# Patient Record
Sex: FEMALE | Race: WHITE | NOT HISPANIC OR LATINO
[De-identification: names, ages, dates, MRNs, and addresses within clinical notes are randomized per-mention and may not be internally consistent; named-entity substitution may affect disease eponyms.]

---

## 2020-07-06 PROBLEM — Z00.00 ENCOUNTER FOR PREVENTIVE HEALTH EXAMINATION: Status: ACTIVE | Noted: 2020-07-06

## 2020-07-16 ENCOUNTER — APPOINTMENT (OUTPATIENT)
Dept: OBGYN | Facility: CLINIC | Age: 50
End: 2020-07-16
Payer: COMMERCIAL

## 2020-07-16 VITALS
HEIGHT: 67 IN | WEIGHT: 165 LBS | SYSTOLIC BLOOD PRESSURE: 128 MMHG | BODY MASS INDEX: 25.9 KG/M2 | TEMPERATURE: 97.7 F | DIASTOLIC BLOOD PRESSURE: 88 MMHG

## 2020-07-16 DIAGNOSIS — Z87.42 PERSONAL HISTORY OF OTHER DISEASES OF THE FEMALE GENITAL TRACT: ICD-10-CM

## 2020-07-16 DIAGNOSIS — N72 INFLAMMATORY DISEASE OF CERVIX UTERI: ICD-10-CM

## 2020-07-16 DIAGNOSIS — Z97.5 PRESENCE OF (INTRAUTERINE) CONTRACEPTIVE DEVICE: ICD-10-CM

## 2020-07-16 DIAGNOSIS — Z80.49 FAMILY HISTORY OF MALIGNANT NEOPLASM OF OTHER GENITAL ORGANS: ICD-10-CM

## 2020-07-16 DIAGNOSIS — N90.7 VULVAR CYST: ICD-10-CM

## 2020-07-16 DIAGNOSIS — N94.9 UNSPECIFIED CONDITION ASSOCIATED WITH FEMALE GENITAL ORGANS AND MENSTRUAL CYCLE: ICD-10-CM

## 2020-07-16 DIAGNOSIS — Z80.41 FAMILY HISTORY OF MALIGNANT NEOPLASM OF OVARY: ICD-10-CM

## 2020-07-16 DIAGNOSIS — N76.0 ACUTE VAGINITIS: ICD-10-CM

## 2020-07-16 DIAGNOSIS — Z86.19 PERSONAL HISTORY OF OTHER INFECTIOUS AND PARASITIC DISEASES: ICD-10-CM

## 2020-07-16 DIAGNOSIS — R87.611 ATYPICAL SQUAMOUS CELLS CANNOT EXCLUDE HIGH GRADE SQUAMOUS INTRAEPITHELIAL LESION ON CYTOLOGIC SMEAR OF CERVIX (ASC-H): ICD-10-CM

## 2020-07-16 DIAGNOSIS — R87.629 UNSPECIFIED ABNORMAL CYTOLOGICAL FINDINGS IN SPECIMENS FROM VAGINA: ICD-10-CM

## 2020-07-16 DIAGNOSIS — N92.6 IRREGULAR MENSTRUATION, UNSPECIFIED: ICD-10-CM

## 2020-07-16 DIAGNOSIS — B96.89 ACUTE VAGINITIS: ICD-10-CM

## 2020-07-16 DIAGNOSIS — N89.8 OTHER SPECIFIED NONINFLAMMATORY DISORDERS OF VAGINA: ICD-10-CM

## 2020-07-16 DIAGNOSIS — N63.0 UNSPECIFIED LUMP IN UNSPECIFIED BREAST: ICD-10-CM

## 2020-07-16 PROCEDURE — 99396 PREV VISIT EST AGE 40-64: CPT

## 2020-07-16 NOTE — PHYSICAL EXAM
[Alert] : alert [Awake] : awake [No Masses] : no breast masses were palpable [Examination Of The Breasts] : a normal appearance [Soft] : soft [Oriented x3] : oriented to person, place, and time [Normal] : uterus [Uterine Adnexae] : were not tender and not enlarged [No Bleeding] : there was no active vaginal bleeding [IUD String] : had an IUD string protruding out [Acute Distress] : no acute distress [Nipple Discharge] : no nipple discharge [Axillary LAD] : no axillary lymphadenopathy [Mass] : no breast mass [Tender] : non tender

## 2020-07-16 NOTE — COUNSELING
[Nutrition] : nutrition [Breast Self Exam] : breast self exam [Exercise] : exercise [Menstrual Calendar] : menstrual calendar [Vitamins/Supplements] : vitamins/supplements

## 2020-10-05 ENCOUNTER — APPOINTMENT (OUTPATIENT)
Dept: PLASTIC SURGERY | Facility: CLINIC | Age: 50
End: 2020-10-05
Payer: COMMERCIAL

## 2020-10-05 VITALS — WEIGHT: 160 LBS | HEIGHT: 67 IN | BODY MASS INDEX: 25.11 KG/M2

## 2020-10-05 DIAGNOSIS — R23.2 FLUSHING: ICD-10-CM

## 2020-10-05 DIAGNOSIS — Z87.39 PERSONAL HISTORY OF OTHER DISEASES OF THE MUSCULOSKELETAL SYSTEM AND CONNECTIVE TISSUE: ICD-10-CM

## 2020-10-05 PROCEDURE — 99203 OFFICE O/P NEW LOW 30 MIN: CPT

## 2020-10-05 RX ORDER — MV-MIN/FOLIC/VIT K/LYCOP/COQ10 200-100MCG
CAPSULE ORAL
Refills: 0 | Status: ACTIVE | COMMUNITY

## 2020-10-05 NOTE — DATA REVIEWED
[FreeTextEntry1] : 7/720\par BL screening mammogram\par BIRADS2\par bilateral dense breasts\par No masses or calcifications or other findings in either breasts\par Stable small subtle nodular density in -6 oclock right breast\par \par

## 2020-10-05 NOTE — PHYSICAL EXAM
[Bra Size: _______] : Bra Size: [unfilled] [de-identified] : well-appearing, NAD [de-identified] : NCAT [de-identified] : EOMI [de-identified] : supple [de-identified] : good inspiratory effort [de-identified] : chest wall asymmetry\par sternum offset to left ~3 cm with convex downslope toward left\par Bilateral shoulder grooving present [de-identified] : Left breast: no palpable masses, nipple retraction or discharge.\par Right breast: no palpable masses, nipple retraction or discharge.  upper outer quadrant with seborrheic keratosis x 3 with no red flag pigmentary changes\par BL NAC sensate\par No bilateral axillary rolls\par Bilateral wide pendulous macromastia (right < left ~150 g) with Grade II ptosis with no active inframammary fold rash; bilateral superior pole deflation\par Anticipated volume of resection of EACH breast based on CLINICAL ASSESSMENT: 800-1000 g\par Anticipated volume of resection of EACH breast based on BSA: 475 g\par \par Breast measurements (standing, cm):\par R SN-Nipple: 31.5\par R Nipple-IMF: 13\par R Nipple - midsternum: 11\par R NAC diameter: 8.6\par IMF position asymmetrical, left 2.5 cm lower than right breast\par L SN-Nipple: 33\par L Nipple-IMF: 16.5\par L Nipple - midsternum: 12\par L NAC diameter: 9 [de-identified] : soft, NT, ND, moderately protuberant [de-identified] : FROM x 4

## 2020-10-05 NOTE — ASSESSMENT
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 \par -The risks include but are not limited to bleeding, infection, seroma, hematoma, wound separation or poor wound healing, tissue ischemia/necrosis of skin flap or NAC, scarring in particular hypertrophic or keloid scarring, breast asymmetry, need for additional surgery, dissatisfaction with outcome, loss of NAC sensation, and inability to breastfeed postpartum in the future, and no improvement of neck pain.\par -I reviewed with the patient the location of incisional scars, possible use of surgical drain, need to wear surgical support bra post-operatively, and no post-operative heavy lifting.\par -The patient understands the procedure and the risks, and she elects to proceed with reduction mammoplasty. Informed consent was obtained.\par -Will schedule her for outpatient SARAY procedure.\par -Follow up with Dr Wei regarding mammogram\par -Photos were taken.

## 2020-10-05 NOTE — HISTORY OF PRESENT ILLNESS
[FreeTextEntry1] : 49 yo  F perimenopausal with PMHx dense large breasts since puberty.  She had waited for breast reduction until after having children as she wanted to breast feed.  she reports having upper and mid back pain, shoulder strap grooving, and chronic skin changes IMF. Recommended by chiropractor to seek evaluation for breast reduction as ongoing back pain exacerbated by large breasts.\par \par Weight stable for at least 6 months\par \par FamHx: no breast cancer, skin cancer\par +Breastfeeding\par Current bra size: 36 DDD, would like to be as small as possible (B to C cup)

## 2020-10-05 NOTE — REASON FOR VISIT
[Initial Evaluation] : an initial evaluation [FreeTextEntry1] : Charlotte Wei - Breast; BRAYDEN Maria

## 2020-10-20 ENCOUNTER — APPOINTMENT (OUTPATIENT)
Dept: SURGERY | Facility: CLINIC | Age: 50
End: 2020-10-20
Payer: COMMERCIAL

## 2020-10-20 VITALS
OXYGEN SATURATION: 98 % | HEIGHT: 67 IN | WEIGHT: 172 LBS | DIASTOLIC BLOOD PRESSURE: 75 MMHG | HEART RATE: 78 BPM | SYSTOLIC BLOOD PRESSURE: 130 MMHG | TEMPERATURE: 97.1 F | BODY MASS INDEX: 27 KG/M2

## 2020-10-20 DIAGNOSIS — Z80.41 FAMILY HISTORY OF MALIGNANT NEOPLASM OF OVARY: ICD-10-CM

## 2020-10-20 DIAGNOSIS — R92.1 MAMMOGRAPHIC CALCIFICATION FOUND ON DIAGNOSTIC IMAGING OF BREAST: ICD-10-CM

## 2020-10-20 DIAGNOSIS — R92.2 INCONCLUSIVE MAMMOGRAM: ICD-10-CM

## 2020-10-20 PROCEDURE — 99203 OFFICE O/P NEW LOW 30 MIN: CPT

## 2020-10-20 NOTE — PAST MEDICAL HISTORY
[Menstruating] : The patient is menstruating [Menarche Age ____] : age at menarche was [unfilled] [Approximately ___] : the LMP was approximately [unfilled] [Normal Amount/Duration] : it was of a normal amount and duration [Irregular Cycle Intervals] : are  irregular [Total Preg ___] : G[unfilled] [Live Births ___] : P[unfilled]  [Full Term ___] : Full Term: [unfilled] [Age At Live Birth ___] : Age at live birth: [unfilled] [Living ___] : Living: [unfilled] [History of Hormone Replacement Treatment] : has no history of hormone replacement treatment [FreeTextEntry7] : age 29 -29  [FreeTextEntry8] : age 31 and 37 for 3 months each child  [FreeTextEntry6] : none

## 2020-10-20 NOTE — ASSESSMENT
[FreeTextEntry1] : 50 year old female with a macromastia and asymmetry of the breasts with benign findings on mammogram and sonogram. No evidence of malignancy on the clinical exam or radiological evaluation. Because the clinical findings patient is a candidate for reduction mammoplasty for which she can follow up with plastic surgery.

## 2020-10-20 NOTE — PHYSICAL EXAM
[Normocephalic] : normocephalic [No Supraclavicular Adenopathy] : no supraclavicular adenopathy [No Cervical Adenopathy] : no cervical adenopathy [Examined in the supine and seated position] : examined in the supine and seated position [Grade 2] : Ptosis Grade 2 [No dominant masses] : no dominant masses in right breast  [Asymmetrical] : asymmetrical [No dominant masses] : no dominant masses left breast [Breast Mass Right Breast ___cm] : no masses [Breast Mass Left Breast ___cm] : no masses [Breast Nipple Retraction] : nipples not retracted [Breast Nipple Inversion] : nipples not inverted [Breast Nipple Fissures] : nipples not fissured [Breast Abnormal Lactation (Galactorrhea)] : no galactorrhea [Breast Nipple Flattening] : nipples not flattened [Breast Abnormal Secretion Bloody Fluid] : no bloody discharge [Breast Abnormal Secretion Serous Fluid] : no serous discharge [Breast Abnormal Secretion Opalescent Fluid] : no milky discharge [No Axillary Lymphadenopathy] : no left axillary lymphadenopathy

## 2020-10-20 NOTE — REASON FOR VISIT
[Initial Evaluation] : an initial evaluation [FreeTextEntry1] : breast check up having a breast reduction on 11/17/2020 no family Hx of cancer

## 2020-10-20 NOTE — HISTORY OF PRESENT ILLNESS
[FreeTextEntry1] : This 50-year-old female presents for evaluation of a macromastia and dense breast tissue and some calcifications on the mammogram. Patient has no family history of breast cancer and positive family history of ovarian cancer. Patient had mammograms for at least the past 3 years which were reviewed. Patient also a subsequent sonogram this year which was also benign. Because of the macromastia and asymmetry of the breasts left being larger than the right patient's considering reduction mammoplasty for which she was seen by plastic surgery and is awaiting surgery. Patient does report increased heaviness of the breast tissue and back discomfort for which she has been followed by a chiropractor.

## 2020-10-23 ENCOUNTER — APPOINTMENT (OUTPATIENT)
Dept: PLASTIC SURGERY | Facility: CLINIC | Age: 50
End: 2020-10-23
Payer: COMMERCIAL

## 2020-10-23 PROCEDURE — 99211 OFF/OP EST MAY X REQ PHY/QHP: CPT

## 2020-10-23 PROCEDURE — 99072 ADDL SUPL MATRL&STAF TM PHE: CPT

## 2020-10-23 NOTE — HISTORY OF PRESENT ILLNESS
[FreeTextEntry1] : 49 yo  F perimenopausal with PMHx dense large breasts since puberty.  She had waited for breast reduction until after having children as she wanted to breast feed.  she reports having upper and mid back pain, shoulder strap grooving, and chronic skin changes IMF. Recommended by chiropractor to seek evaluation for breast reduction as ongoing back pain exacerbated by large breasts.\par \par Weight stable for at least 6 months\par \par FamHx: no breast cancer, skin cancer\par +Breastfeeding\par Current bra size: 36 DDD, would like to be as small as possible (B to C cup)\par \par Interval hx (10/23/20). Patient presents today with her  for f/u to address additional concerns. Denies any new complaints or changes to her status. Interested in pursuing reduction mammoplasty.

## 2020-10-23 NOTE — ASSESSMENT
[FreeTextEntry1] : 51 yo woman with PMHx of scoliosis with chest wall asymmetry with symptomatic bilateral macromastia with Grade II ptosis who is a good candidate for bilateral reduction mammoplasty with an inferior pedicle Wise pattern technique.\par \par - all patient's questions answered to her satisfaction\par - pre and post-surgical pictures reviewed\par - patient reports readiness to undergo the proposed surgical procedure \par - 15 minutes spent addressing her concerns\par

## 2020-11-02 ENCOUNTER — OUTPATIENT (OUTPATIENT)
Dept: OUTPATIENT SERVICES | Facility: HOSPITAL | Age: 50
LOS: 1 days | Discharge: HOME | End: 2020-11-02
Payer: COMMERCIAL

## 2020-11-02 DIAGNOSIS — N62 HYPERTROPHY OF BREAST: ICD-10-CM

## 2020-11-02 DIAGNOSIS — Z01.818 ENCOUNTER FOR OTHER PREPROCEDURAL EXAMINATION: ICD-10-CM

## 2020-11-02 LAB
ALBUMIN SERPL ELPH-MCNC: 4.6 G/DL — SIGNIFICANT CHANGE UP (ref 3.5–5.2)
ALP SERPL-CCNC: 86 U/L — SIGNIFICANT CHANGE UP (ref 30–115)
ALT FLD-CCNC: 14 U/L — SIGNIFICANT CHANGE UP (ref 0–41)
ANION GAP SERPL CALC-SCNC: 9 MMOL/L — SIGNIFICANT CHANGE UP (ref 7–14)
APTT BLD: 28.6 SEC — SIGNIFICANT CHANGE UP (ref 27–39.2)
AST SERPL-CCNC: 14 U/L — SIGNIFICANT CHANGE UP (ref 0–41)
BASOPHILS # BLD AUTO: 0.03 K/UL — SIGNIFICANT CHANGE UP (ref 0–0.2)
BASOPHILS NFR BLD AUTO: 0.5 % — SIGNIFICANT CHANGE UP (ref 0–1)
BILIRUB SERPL-MCNC: 0.2 MG/DL — SIGNIFICANT CHANGE UP (ref 0.2–1.2)
BUN SERPL-MCNC: 14 MG/DL — SIGNIFICANT CHANGE UP (ref 10–20)
CALCIUM SERPL-MCNC: 9.7 MG/DL — SIGNIFICANT CHANGE UP (ref 8.5–10.1)
CHLORIDE SERPL-SCNC: 103 MMOL/L — SIGNIFICANT CHANGE UP (ref 98–110)
CO2 SERPL-SCNC: 26 MMOL/L — SIGNIFICANT CHANGE UP (ref 17–32)
CREAT SERPL-MCNC: 0.8 MG/DL — SIGNIFICANT CHANGE UP (ref 0.7–1.5)
EOSINOPHIL # BLD AUTO: 0.07 K/UL — SIGNIFICANT CHANGE UP (ref 0–0.7)
EOSINOPHIL NFR BLD AUTO: 1.2 % — SIGNIFICANT CHANGE UP (ref 0–8)
GLUCOSE SERPL-MCNC: 84 MG/DL — SIGNIFICANT CHANGE UP (ref 70–99)
HCT VFR BLD CALC: 39.6 % — SIGNIFICANT CHANGE UP (ref 37–47)
HGB BLD-MCNC: 12.8 G/DL — SIGNIFICANT CHANGE UP (ref 12–16)
IMM GRANULOCYTES NFR BLD AUTO: 0.2 % — SIGNIFICANT CHANGE UP (ref 0.1–0.3)
INR BLD: 0.97 RATIO — SIGNIFICANT CHANGE UP (ref 0.65–1.3)
LYMPHOCYTES # BLD AUTO: 1.1 K/UL — LOW (ref 1.2–3.4)
LYMPHOCYTES # BLD AUTO: 19.5 % — LOW (ref 20.5–51.1)
MCHC RBC-ENTMCNC: 31 PG — SIGNIFICANT CHANGE UP (ref 27–31)
MCHC RBC-ENTMCNC: 32.3 G/DL — SIGNIFICANT CHANGE UP (ref 32–37)
MCV RBC AUTO: 95.9 FL — SIGNIFICANT CHANGE UP (ref 81–99)
MONOCYTES # BLD AUTO: 0.47 K/UL — SIGNIFICANT CHANGE UP (ref 0.1–0.6)
MONOCYTES NFR BLD AUTO: 8.3 % — SIGNIFICANT CHANGE UP (ref 1.7–9.3)
NEUTROPHILS # BLD AUTO: 3.96 K/UL — SIGNIFICANT CHANGE UP (ref 1.4–6.5)
NEUTROPHILS NFR BLD AUTO: 70.3 % — SIGNIFICANT CHANGE UP (ref 42.2–75.2)
NRBC # BLD: 0 /100 WBCS — SIGNIFICANT CHANGE UP (ref 0–0)
PLATELET # BLD AUTO: 313 K/UL — SIGNIFICANT CHANGE UP (ref 130–400)
POTASSIUM SERPL-MCNC: 4.9 MMOL/L — SIGNIFICANT CHANGE UP (ref 3.5–5)
POTASSIUM SERPL-SCNC: 4.9 MMOL/L — SIGNIFICANT CHANGE UP (ref 3.5–5)
PROT SERPL-MCNC: 7.5 G/DL — SIGNIFICANT CHANGE UP (ref 6–8)
PROTHROM AB SERPL-ACNC: 11.2 SEC — SIGNIFICANT CHANGE UP (ref 9.95–12.87)
RBC # BLD: 4.13 M/UL — LOW (ref 4.2–5.4)
RBC # FLD: 13 % — SIGNIFICANT CHANGE UP (ref 11.5–14.5)
SODIUM SERPL-SCNC: 138 MMOL/L — SIGNIFICANT CHANGE UP (ref 135–146)
WBC # BLD: 5.64 K/UL — SIGNIFICANT CHANGE UP (ref 4.8–10.8)
WBC # FLD AUTO: 5.64 K/UL — SIGNIFICANT CHANGE UP (ref 4.8–10.8)

## 2020-11-02 PROCEDURE — 93010 ELECTROCARDIOGRAM REPORT: CPT

## 2020-11-04 DIAGNOSIS — Z01.818 ENCOUNTER FOR OTHER PREPROCEDURAL EXAMINATION: ICD-10-CM

## 2020-11-11 DIAGNOSIS — M79.2 NEURALGIA AND NEURITIS, UNSPECIFIED: ICD-10-CM

## 2020-11-11 DIAGNOSIS — G89.18 OTHER ACUTE POSTPROCEDURAL PAIN: ICD-10-CM

## 2020-11-11 RX ORDER — GABAPENTIN 300 MG/1
300 CAPSULE ORAL
Qty: 7 | Refills: 0 | Status: ACTIVE | COMMUNITY
Start: 2020-11-11 | End: 1900-01-01

## 2020-11-11 RX ORDER — CELECOXIB 400 MG/1
400 CAPSULE ORAL
Qty: 7 | Refills: 0 | Status: ACTIVE | COMMUNITY
Start: 2020-11-11 | End: 1900-01-01

## 2020-11-16 ENCOUNTER — TRANSCRIPTION ENCOUNTER (OUTPATIENT)
Age: 50
End: 2020-11-16

## 2020-11-17 ENCOUNTER — APPOINTMENT (OUTPATIENT)
Dept: PLASTIC SURGERY | Facility: AMBULATORY SURGERY CENTER | Age: 50
End: 2020-11-17
Payer: COMMERCIAL

## 2020-11-17 ENCOUNTER — RESULT REVIEW (OUTPATIENT)
Age: 50
End: 2020-11-17

## 2020-11-17 ENCOUNTER — OUTPATIENT (OUTPATIENT)
Dept: OUTPATIENT SERVICES | Facility: HOSPITAL | Age: 50
LOS: 1 days | Discharge: HOME | End: 2020-11-17
Payer: COMMERCIAL

## 2020-11-17 VITALS
HEIGHT: 67 IN | HEART RATE: 72 BPM | RESPIRATION RATE: 18 BRPM | DIASTOLIC BLOOD PRESSURE: 86 MMHG | OXYGEN SATURATION: 98 % | SYSTOLIC BLOOD PRESSURE: 141 MMHG | WEIGHT: 170.42 LBS | TEMPERATURE: 98 F

## 2020-11-17 VITALS
RESPIRATION RATE: 18 BRPM | TEMPERATURE: 98 F | SYSTOLIC BLOOD PRESSURE: 125 MMHG | OXYGEN SATURATION: 98 % | HEART RATE: 80 BPM | DIASTOLIC BLOOD PRESSURE: 69 MMHG

## 2020-11-17 PROCEDURE — 19318 BREAST REDUCTION: CPT | Mod: 50

## 2020-11-17 PROCEDURE — 88305 TISSUE EXAM BY PATHOLOGIST: CPT | Mod: 26

## 2020-11-17 RX ORDER — ACETAMINOPHEN 500 MG
650 TABLET ORAL ONCE
Refills: 0 | Status: DISCONTINUED | OUTPATIENT
Start: 2020-11-17 | End: 2020-12-01

## 2020-11-17 RX ORDER — GABAPENTIN 400 MG/1
1 CAPSULE ORAL
Qty: 0 | Refills: 0 | DISCHARGE

## 2020-11-17 RX ORDER — ONDANSETRON 8 MG/1
1 TABLET, FILM COATED ORAL
Qty: 10 | Refills: 0
Start: 2020-11-17

## 2020-11-17 RX ORDER — ONDANSETRON 8 MG/1
4 TABLET, FILM COATED ORAL ONCE
Refills: 0 | Status: DISCONTINUED | OUTPATIENT
Start: 2020-11-17 | End: 2020-12-01

## 2020-11-17 RX ORDER — HYDROMORPHONE HYDROCHLORIDE 2 MG/ML
0.5 INJECTION INTRAMUSCULAR; INTRAVENOUS; SUBCUTANEOUS
Refills: 0 | Status: DISCONTINUED | OUTPATIENT
Start: 2020-11-17 | End: 2020-11-17

## 2020-11-17 RX ORDER — ACETAMINOPHEN 500 MG
500 TABLET ORAL
Qty: 0 | Refills: 0 | DISCHARGE

## 2020-11-17 RX ORDER — TRAMADOL HYDROCHLORIDE 50 MG/1
1 TABLET ORAL
Qty: 10 | Refills: 0
Start: 2020-11-17

## 2020-11-17 RX ORDER — MORPHINE SULFATE 50 MG/1
2 CAPSULE, EXTENDED RELEASE ORAL
Refills: 0 | Status: DISCONTINUED | OUTPATIENT
Start: 2020-11-17 | End: 2020-11-17

## 2020-11-17 RX ORDER — SODIUM CHLORIDE 9 MG/ML
1000 INJECTION, SOLUTION INTRAVENOUS
Refills: 0 | Status: DISCONTINUED | OUTPATIENT
Start: 2020-11-17 | End: 2020-12-01

## 2020-11-17 RX ADMIN — SODIUM CHLORIDE 100 MILLILITER(S): 9 INJECTION, SOLUTION INTRAVENOUS at 15:23

## 2020-11-17 NOTE — ASU DISCHARGE PLAN (ADULT/PEDIATRIC) - CARE PROVIDER_API CALL
Macrina Wu)  [facility]  [address] [place] [address], [address]  [place] [address]  Phone: [phone]  Fax: [phone]  Follow Up Time:

## 2020-11-17 NOTE — BRIEF OPERATIVE NOTE - OPERATION/FINDINGS
Bilateral breast reduction performed via inferior wise pedicle pattern Bilateral breast reduction performed via inferior wise pedicle pattern  Right breast 532 g; Left breast 754 g

## 2020-11-17 NOTE — PRE-ANESTHESIA EVALUATION ADULT - NSANTHOSAYNRD_GEN_A_CORE
No. SHERRY screening performed.  STOP BANG Legend: 0-2 = LOW Risk; 3-4 = INTERMEDIATE Risk; 5-8 = HIGH Risk

## 2020-11-17 NOTE — ASU DISCHARGE PLAN (ADULT/PEDIATRIC) - ASU DC SPECIAL INSTRUCTIONSFT
Diet: You may resume your usual diet. Avoid alcohol and excessive salt intake for two weeks following surgery. This will help to minimize swelling.    Medication: Continue Gabapentin and Celebrex twice daily until complete. Take Tylenol 650mg every 6 hours. If pain is still not well controlled, take Tramadol as needed. Take all antibiotics as prescribed. Remember, no Aspirin or NSAIDS (Advil, Motrin, Aleve) as they may increase bruising.    Activity: Start walking as soon as possible to increase circulation and prevent blood clots. You may take care of your personal needs as desired, however, no lifting or strenuous activity is allowed for 4 weeks following surgery. Driving is not permitted for at least two weeks.    Wound care: Keep your dressing clean, dry, and intact until seen by MD/PA. Wear the surgical bra which will be provided to you at all times. Do not smoke! It delays wound healing and increases the risk of complications.    Personal Hygiene: Do not get the operative area wet. You are allowed to sponge bathe. Do not remove the surgical bra. No tub soaking.    Sun Exposure: You may be in the sun one month following surgery. Avoid sunburn. Always use a sunscreen of at least SPF30.    Things to expect: The operative area may be bruised, swollen, and painful. You may also have temporary numbness which will improve over time.    In case of emergency: call the office any time day or night. Post-operative care will be provided in the office one week following surgery. If you do not already have an appointment, please call during regular office hours to schedule: [phone].     We wish you a pleasant recovery. Diet: You may resume your usual diet. Avoid alcohol and excessive salt intake for two weeks following surgery. This will help to minimize swelling.    Medication: Continue Gabapentin and Celebrex twice daily until complete. Take Tylenol 650mg every 6 hours. If pain is still not well controlled, take Tramadol as needed. Take all antibiotics as prescribed. Remember, no Aspirin or NSAIDS (Advil, Motrin, Aleve) as they may increase bruising.     Activity: Start walking as soon as possible to increase circulation and prevent blood clots. You may take care of your personal needs as desired, however, no lifting or strenuous activity is allowed for 4 weeks following surgery. Driving is not permitted for at least two weeks.    Wound care: Keep your dressing clean, dry, and intact until seen by MD/PA. Wear the surgical bra which will be provided to you at all times. Do not smoke! It delays wound healing and increases the risk of complications.    Personal Hygiene: Do not get the operative area wet. You are allowed to sponge bathe. Do not remove the surgical bra. No tub soaking.    Sun Exposure: You may be in the sun one month following surgery. Avoid sunburn. Always use a sunscreen of at least SPF30.    Things to expect: The operative area may be bruised, swollen, and painful. You may also have temporary numbness which will improve over time.    In case of emergency: call the office any time day or night. Post-operative care will be provided in the office one week following surgery. If you do not already have an appointment, please call during regular office hours to schedule: [phone].     We wish you a pleasant recovery. Diet: You may resume your usual diet. Avoid alcohol and excessive salt intake for two weeks following surgery. This will help to minimize swelling.    Medication: Continue Gabapentin and Celebrex twice daily until complete. Take Tylenol 650mg every 6 hours. If pain is still not well controlled, take Tramadol as needed. Take all antibiotics as prescribed. Take Zofran as needed for nausea. Remember, no Aspirin or NSAIDS (Advil, Motrin, Aleve) as they may increase bruising.     Activity: Start walking as soon as possible to increase circulation and prevent blood clots. You may take care of your personal needs as desired, however, no lifting or strenuous activity is allowed for 4 weeks following surgery. Driving is not permitted for at least two weeks.    Wound care: Keep your dressing clean, dry, and intact until seen by MD/PA. Wear the surgical bra which will be provided to you at all times. Do not smoke! It delays wound healing and increases the risk of complications.    Personal Hygiene: Do not get the operative area wet. You are allowed to sponge bathe. Do not remove the surgical bra. No tub soaking.    Sun Exposure: You may be in the sun one month following surgery. Avoid sunburn. Always use a sunscreen of at least SPF30.    Things to expect: The operative area may be bruised, swollen, and painful. You may also have temporary numbness which will improve over time.    In case of emergency: call the office any time day or night. Post-operative care will be provided in the office one week following surgery. If you do not already have an appointment, please call during regular office hours to schedule: [phone].     We wish you a pleasant recovery.

## 2020-11-17 NOTE — CHART NOTE - NSCHARTNOTEFT_GEN_A_CORE
PACU ANESTHESIA ADMISSION NOTE      Procedure: Reduction mammoplasty for macromastia      Post op diagnosis:  Macromastia        ____  Intubated  TV:______       Rate: ______      FiO2: ______    __x__  Patent Airway    __x__  Full return of protective reflexes    __x__  Full recovery from anesthesia / back to baseline status    Vitals  HR: 78  BP: 150/81  RR: 15  O2 Sat: 98  Temp: 98.5    Mental Status:  __x__ Awake   _____ Alert   __x___ Drowsy   _____ Sedated    Nausea/Vomiting:  __x__ NO  ______Yes,   See Post - Op Orders          Pain Scale (0-10):  _____    Treatment: ____ None    __x__ See Post - Op/PCA Orders    Post - Operative Fluids:   ____ Oral   __x__ See Post - Op Orders    Plan: Discharge when criteria met:   __x__Home       _____Floor     _____Critical Care   Other:_________________    Comments: Patient had smooth intraoperative event, no anesthesia complication.

## 2020-11-18 ENCOUNTER — NON-APPOINTMENT (OUTPATIENT)
Age: 50
End: 2020-11-18

## 2020-11-18 ENCOUNTER — TRANSCRIPTION ENCOUNTER (OUTPATIENT)
Age: 50
End: 2020-11-18

## 2020-11-20 DIAGNOSIS — M41.9 SCOLIOSIS, UNSPECIFIED: ICD-10-CM

## 2020-11-20 DIAGNOSIS — N62 HYPERTROPHY OF BREAST: ICD-10-CM

## 2020-11-20 DIAGNOSIS — N64.89 OTHER SPECIFIED DISORDERS OF BREAST: ICD-10-CM

## 2020-11-20 LAB — SURGICAL PATHOLOGY STUDY: SIGNIFICANT CHANGE UP

## 2020-11-23 ENCOUNTER — APPOINTMENT (OUTPATIENT)
Dept: PLASTIC SURGERY | Facility: CLINIC | Age: 50
End: 2020-11-23
Payer: COMMERCIAL

## 2020-11-23 PROBLEM — Z78.9 OTHER SPECIFIED HEALTH STATUS: Chronic | Status: ACTIVE | Noted: 2020-11-02

## 2020-11-23 PROCEDURE — 99024 POSTOP FOLLOW-UP VISIT: CPT

## 2020-11-23 NOTE — PHYSICAL EXAM
[de-identified] : well-appearing, NAD [de-identified] : BL breasts soft, warm, swelling as expected R>L, fading ecchymoses, BL nipple sensation intact, no s/s cellulitis\par Right breast negative needle aspiration performed

## 2020-11-23 NOTE — HISTORY OF PRESENT ILLNESS
[FreeTextEntry1] : 51 yo  F perimenopausal with PMHx dense large breasts since puberty.  She had waited for breast reduction until after having children as she wanted to breast feed.  she reports having upper and mid back pain, shoulder strap grooving, and chronic skin changes IMF. Recommended by chiropractor to seek evaluation for breast reduction as ongoing back pain exacerbated by large breasts.\par \par Weight stable for at least 6 months\par \par FamHx: no breast cancer, skin cancer\par +Breastfeeding\par Current bra size: 36 DDD, would like to be as small as possible (B to C cup)\par \par Interval hx (10/23/20). Patient presents today with her  for f/u to address additional concerns. Denies any new complaints or changes to her status. Interested in pursuing reduction mammoplasty. \par \par Interval hx (20): Pt presents today POD #6 s/p BBR. C/o mild postop pain which has resolved, taking only Tylenol PRN. Denies f/c, CP/SOB, calf pain, or cough. Happy with results thus far reporting improvement of neck and back pain.

## 2020-11-23 NOTE — ASSESSMENT
[FreeTextEntry1] : 49 yo woman with PMHx of scoliosis with chest wall asymmetry with symptomatic bilateral macromastia with Grade II ptosis, now POD #6 s/p BBR, doing well\par Right breast needle aspiration perofrmed 11/23- negative\par \par -Pathology reviewed: benign\par -Bandages changed\par -May shower\par -Continue surgical bra, no heavy lifting\par -F/u 1 week for suture removal\par \par Due to COVID-19, pre-visit patient instructions were explained to the patient and their symptoms were checked upon arrival. Masks were used by the healthcare provider and staff and the examination room was cleaned after the patient visit concluded\par \par

## 2020-12-02 ENCOUNTER — APPOINTMENT (OUTPATIENT)
Dept: PLASTIC SURGERY | Facility: CLINIC | Age: 50
End: 2020-12-02
Payer: COMMERCIAL

## 2020-12-02 PROCEDURE — 99024 POSTOP FOLLOW-UP VISIT: CPT

## 2020-12-02 NOTE — DATA REVIEWED
[FreeTextEntry1] :  Pathology             Final\par \par No Documents Attached\par \par \par \par   Brien Accession Number : [id number]\par \par CARINA SHINE 2\par \par \par \par Surgical Final Report\par \par \par \par \par Final Diagnosis\par 1. Right breast tissue (553 gms):\par - Fragments of skin and breast tissue consistent with surgical\par reduction procedure specimen (performed for clinical\par macromastia).\par - Fibrocystic changes\par - No malignancy is seen in the examined tissue sections.\par \par 2. Left breast tissue (798 gms):\par - Fragments of skin and breast tissue consistent with surgical\par reduction procedure specimen (performed for clinical\par macromastia).\par - Fibrocystic changes.\par - No malignancy is seen in the examined tissue sections.\par \par Verified by: Chaim Porter MD\par (Electronic Signature)\par Reported on: 11/20/20 13:00 EST, [address] [place] [address]\[facility] [place] [address]\par Phone: [phone]   Fax: [phone]\par _________________________________________________________________\par \par Clinical History\par Bilateral breast reduction\par \par Specimen(s) Submitted\par 1     Right breast tissue\par 2     Left breast tissue\par \par Gross Description\par 1. The specimen is received fresh, labeled "right breast tissue"\par and consists of fragments of lobulated fibroadipose tissue with\par skin measuring 20 x 15 x 6 cm in aggregate and weighing 553 gm.\par The specimen is serially sectioned to show lobulated yellow\par adipose tissue with multiple whitish firm areas. Representative\par sections are submitted. (5 blocks)\par \par 2. The specimen is received fresh, labeled "left breast tissue"\par and consists of multiple fragments of lobulated fibroadipose\par tissue with skin measuring 20 x 20 x 6 cm in aggregate and\par weighing 798 gm. Specimen is serially sectioned to show yellow\par fibroadipose tissue with whitish areas. Representative sections\par are submitted. (5 blocks)\par \par Specimen was received and underwent gross examination at [facility], [address] [place] [address],\[place] [address].\par \par \par \par \par \par \par TIANNASHERI AQUINOAKBAR             2\par \par \par \par Surgical Final Report\par \par \par \par \par 11/17/20 16:55 ia\par \par Perioperative Diagnosis\par Bilateral macromastia\par \par  \par \par  Ordered by: SEDRICK SOLIMAN       Collected/Examined: 17Nov2020 02:30PM       \par Verified by: DAWN STONE 23Nov2020 05:41PM       \par  Result Communication: Discussed results with patient;\par Stage: Final       \par  Performed at: [facility]       Resulted: 20Nov2020 01:00PM       Last Updated: 23Nov2020 05:41PM       Accession: [id number]

## 2020-12-02 NOTE — PHYSICAL EXAM
[de-identified] : well-appearing, NAD [de-identified] : BL breasts soft, resolving bruising R>L, incisions fine, c/d/i with minimal superficial irritation secondary to adhesive,  BL nipple sensation intact, no s/s cellulitis or fluid collection, excellent symmetry \par

## 2020-12-02 NOTE — ASSESSMENT
[FreeTextEntry1] : 49 yo woman with PMHx of scoliosis with chest wall asymmetry with symptomatic bilateral macromastia with Grade II ptosis, now POD #15 s/p BBR, doing well.\par Right breast needle aspiration performed 11/23- negative\par \par -Suture tails and all steri strips removed \par -Daily Aquaphor \par -Continue surgical bra, may switch to sports bra\par -Post-op instructions discussed and all questions answered\par -F/u 1 month with Dr. Wu\par \par Due to COVID-19, pre-visit patient instructions were explained to the patient and their symptoms were checked upon arrival. Masks were used by the healthcare provider and staff and the examination room was cleaned after the patient visit concluded\par \par

## 2020-12-02 NOTE — HISTORY OF PRESENT ILLNESS
[FreeTextEntry1] : 49 yo  F perimenopausal with PMHx dense large breasts since puberty.  She had waited for breast reduction until after having children as she wanted to breast feed.  she reports having upper and mid back pain, shoulder strap grooving, and chronic skin changes IMF. Recommended by chiropractor to seek evaluation for breast reduction as ongoing back pain exacerbated by large breasts.\par \par Weight stable for at least 6 months\par \par FamHx: no breast cancer, skin cancer\par +Breastfeeding\par Current bra size: 36 DDD, would like to be as small as possible (B to C cup)\par \par Interval hx (10/23/20). Patient presents today with her  for f/u to address additional concerns. Denies any new complaints or changes to her status. Interested in pursuing reduction mammoplasty. \par \par Interval hx (20): Pt presents today POD #6 s/p BBR. C/o mild postop pain which has resolved, taking only Tylenol PRN. Denies f/c, CP/SOB, calf pain, or cough. Happy with results thus far reporting improvement of neck and back pain.\par \par Interval hx (20): Pt presents today POD #15 s/p BBR. Doing very well and reporting significant improvement in neck and back pain. Happy with results. Denies any f/c or bleeding c/o periincisional pruritus.

## 2021-01-08 ENCOUNTER — APPOINTMENT (OUTPATIENT)
Dept: PLASTIC SURGERY | Facility: CLINIC | Age: 51
End: 2021-01-08
Payer: COMMERCIAL

## 2021-01-08 PROCEDURE — 99024 POSTOP FOLLOW-UP VISIT: CPT

## 2021-01-08 NOTE — PHYSICAL EXAM
[de-identified] : well-appearing, NAD [de-identified] : BL breasts soft, incisions healing well fine, BL nipple sensation intact, no s/s cellulitis or fluid collection, excellent symmetry \par

## 2021-01-08 NOTE — ASSESSMENT
[FreeTextEntry1] : 51 yo woman with PMHx of scoliosis with chest wall asymmetry with symptomatic bilateral macromastia with Grade II ptosis, now 7.5 weeks s/p BBR, doing well.\par Right breast needle aspiration performed 11/23- negative\par \par -Continue scar creams, daily moisturizer\par -No activity or bra restrictions\par -all questions were answered\par -Photos taken\par -Follow up in 6-8 weeks\par \par Due to COVID-19, pre-visit patient instructions were explained to the patient and their symptoms were checked upon arrival. Masks were used by the healthcare provider and staff and the examination room was cleaned after the patient visit concluded\par \par

## 2021-01-08 NOTE — HISTORY OF PRESENT ILLNESS
[FreeTextEntry1] : 51 yo  F perimenopausal with PMHx dense large breasts since puberty.  She had waited for breast reduction until after having children as she wanted to breast feed.  she reports having upper and mid back pain, shoulder strap grooving, and chronic skin changes IMF. Recommended by chiropractor to seek evaluation for breast reduction as ongoing back pain exacerbated by large breasts.\par \par Weight stable for at least 6 months\par \par FamHx: no breast cancer, skin cancer\par +Breastfeeding\par Current bra size: 36 DDD, would like to be as small as possible (B to C cup)\par \par Interval hx (10/23/20). Patient presents today with her  for f/u to address additional concerns. Denies any new complaints or changes to her status. Interested in pursuing reduction mammoplasty. \par \par Interval hx (20): Pt presents today POD #6 s/p BBR. C/o mild postop pain which has resolved, taking only Tylenol PRN. Denies f/c, CP/SOB, calf pain, or cough. Happy with results thus far reporting improvement of neck and back pain.\par \par Interval hx (20): Pt presents today POD #15 s/p BBR. Doing very well and reporting significant improvement in neck and back pain. Happy with results. Denies any f/c or bleeding c/o periincisional pruritus. \par \par Interval hx (21): 7.5 weeks s/p BBR. Reports resolution of brassiere grooving and neck pain, improvement of back pain. Happy with results.

## 2021-01-08 NOTE — DATA REVIEWED
[FreeTextEntry1] :  Pathology             Final\par \par No Documents Attached\par \par \par \par   Brien Accession Number : [id number]\par \par CARINA SHINE 2\par \par \par \par Surgical Final Report\par \par \par \par \par Final Diagnosis\par 1. Right breast tissue (553 gms):\par - Fragments of skin and breast tissue consistent with surgical\par reduction procedure specimen (performed for clinical\par macromastia).\par - Fibrocystic changes\par - No malignancy is seen in the examined tissue sections.\par \par 2. Left breast tissue (798 gms):\par - Fragments of skin and breast tissue consistent with surgical\par reduction procedure specimen (performed for clinical\par macromastia).\par - Fibrocystic changes.\par - No malignancy is seen in the examined tissue sections.\par \par Verified by: Chaim Porter MD\par (Electronic Signature)\par Reported on: 11/20/20 13:00 EST, [address] [place] [address]\[facility] [place] [address]\par Phone: [phone]   Fax: [phone]\par _________________________________________________________________\par \par Clinical History\par Bilateral breast reduction\par \par Specimen(s) Submitted\par 1     Right breast tissue\par 2     Left breast tissue\par \par Gross Description\par 1. The specimen is received fresh, labeled "right breast tissue"\par and consists of fragments of lobulated fibroadipose tissue with\par skin measuring 20 x 15 x 6 cm in aggregate and weighing 553 gm.\par The specimen is serially sectioned to show lobulated yellow\par adipose tissue with multiple whitish firm areas. Representative\par sections are submitted. (5 blocks)\par \par 2. The specimen is received fresh, labeled "left breast tissue"\par and consists of multiple fragments of lobulated fibroadipose\par tissue with skin measuring 20 x 20 x 6 cm in aggregate and\par weighing 798 gm. Specimen is serially sectioned to show yellow\par fibroadipose tissue with whitish areas. Representative sections\par are submitted. (5 blocks)\par \par Specimen was received and underwent gross examination at [facility], [address] [place] [address],\[place] [address].\par \par \par \par \par \par \par TIANNASHERI AQUINOAKBAR             2\par \par \par \par Surgical Final Report\par \par \par \par \par 11/17/20 16:55 ia\par \par Perioperative Diagnosis\par Bilateral macromastia\par \par  \par \par  Ordered by: SEDRICK SOLIMAN       Collected/Examined: 17Nov2020 02:30PM       \par Verified by: DAWN STONE 23Nov2020 05:41PM       \par  Result Communication: Discussed results with patient;\par Stage: Final       \par  Performed at: [facility]       Resulted: 20Nov2020 01:00PM       Last Updated: 23Nov2020 05:41PM       Accession: [id number]

## 2021-01-15 ENCOUNTER — APPOINTMENT (OUTPATIENT)
Dept: BREAST CENTER | Facility: CLINIC | Age: 51
End: 2021-01-15

## 2021-03-05 ENCOUNTER — APPOINTMENT (OUTPATIENT)
Dept: PLASTIC SURGERY | Facility: CLINIC | Age: 51
End: 2021-03-05
Payer: COMMERCIAL

## 2021-03-05 DIAGNOSIS — N62 HYPERTROPHY OF BREAST: ICD-10-CM

## 2021-03-05 DIAGNOSIS — L91.0 HYPERTROPHIC SCAR: ICD-10-CM

## 2021-03-05 PROCEDURE — 99212 OFFICE O/P EST SF 10 MIN: CPT

## 2021-03-05 PROCEDURE — 99072 ADDL SUPL MATRL&STAF TM PHE: CPT

## 2021-03-05 NOTE — DATA REVIEWED
[FreeTextEntry1] :  Pathology             Final\par \par No Documents Attached\par \par \par \par   Brien Accession Number : [id number]\par \par CARINA SHINE 2\par \par \par \par Surgical Final Report\par \par \par \par \par Final Diagnosis\par 1. Right breast tissue (553 gms):\par - Fragments of skin and breast tissue consistent with surgical\par reduction procedure specimen (performed for clinical\par macromastia).\par - Fibrocystic changes\par - No malignancy is seen in the examined tissue sections.\par \par 2. Left breast tissue (798 gms):\par - Fragments of skin and breast tissue consistent with surgical\par reduction procedure specimen (performed for clinical\par macromastia).\par - Fibrocystic changes.\par - No malignancy is seen in the examined tissue sections.\par \par Verified by: Chaim Porter MD\par (Electronic Signature)\par Reported on: 11/20/20 13:00 EST, [address] [place] [address]\[facility] [place] [address]\par Phone: [phone]   Fax: [phone]\par _________________________________________________________________\par \par Clinical History\par Bilateral breast reduction\par \par Specimen(s) Submitted\par 1     Right breast tissue\par 2     Left breast tissue\par \par Gross Description\par 1. The specimen is received fresh, labeled "right breast tissue"\par and consists of fragments of lobulated fibroadipose tissue with\par skin measuring 20 x 15 x 6 cm in aggregate and weighing 553 gm.\par The specimen is serially sectioned to show lobulated yellow\par adipose tissue with multiple whitish firm areas. Representative\par sections are submitted. (5 blocks)\par \par 2. The specimen is received fresh, labeled "left breast tissue"\par and consists of multiple fragments of lobulated fibroadipose\par tissue with skin measuring 20 x 20 x 6 cm in aggregate and\par weighing 798 gm. Specimen is serially sectioned to show yellow\par fibroadipose tissue with whitish areas. Representative sections\par are submitted. (5 blocks)\par \par Specimen was received and underwent gross examination at [facility], [address] [place] [address],\[place] [address].\par \par \par \par \par \par \par TIANNASHERI AQUINOAKBAR             2\par \par \par \par Surgical Final Report\par \par \par \par \par 11/17/20 16:55 ia\par \par Perioperative Diagnosis\par Bilateral macromastia\par \par  \par \par  Ordered by: SEDRICK SOLIMAN       Collected/Examined: 17Nov2020 02:30PM       \par Verified by: DAWN STONE 23Nov2020 05:41PM       \par  Result Communication: Discussed results with patient;\par Stage: Final       \par  Performed at: [facility]       Resulted: 20Nov2020 01:00PM       Last Updated: 23Nov2020 05:41PM       Accession: [id number]

## 2021-03-05 NOTE — ASSESSMENT
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  B/R/A discussed.  Pt elected to undergo least invasive option: silicone sheeting.\par -all questions were answered\par -Follow up in 6-8 weeks\par \par -Photos taken\par \par Due to COVID-19, pre-visit patient instructions were explained to the patient and their symptoms were checked upon arrival. Masks were used by the healthcare provider and staff and the examination room was cleaned after the patient visit concluded\par \par

## 2021-03-05 NOTE — HISTORY OF PRESENT ILLNESS
[FreeTextEntry1] : 51 yo  F perimenopausal with PMHx dense large breasts since puberty.  She had waited for breast reduction until after having children as she wanted to breast feed.  she reports having upper and mid back pain, shoulder strap grooving, and chronic skin changes IMF. Recommended by chiropractor to seek evaluation for breast reduction as ongoing back pain exacerbated by large breasts.\par \par Weight stable for at least 6 months\par \par FamHx: no breast cancer, skin cancer\par +Breastfeeding\par Current bra size: 36 DDD, would like to be as small as possible (B to C cup)\par \par Interval hx (10/23/20). Patient presents today with her  for f/u to address additional concerns. Denies any new complaints or changes to her status. Interested in pursuing reduction mammoplasty. \par \par Interval hx (20): Pt presents today POD #6 s/p BBR. C/o mild postop pain which has resolved, taking only Tylenol PRN. Denies f/c, CP/SOB, calf pain, or cough. Happy with results thus far reporting improvement of neck and back pain.\par \par Interval hx (20): Pt presents today POD #15 s/p BBR. Doing very well and reporting significant improvement in neck and back pain. Happy with results. Denies any f/c or bleeding c/o periincisional pruritus. \par \par Interval hx (21): 7.5 weeks s/p BBR. Reports resolution of brassiere grooving and neck pain, improvement of back pain. Happy with results.\par \par Interval hx (3/5/21): 3.5 months s/p BBR. She is now ~36D cup.  Moderate residual back pain , possible 2/2 scoliosis. c/o raised scars with itchiness.

## 2021-03-05 NOTE — PHYSICAL EXAM
[de-identified] : well-appearing, NAD [de-identified] : BL breasts soft, incisions well-healed (IMF scars bright red and raised, nontender), BL nipple sensation intact, no s/s cellulitis or fluid collection, excellent symmetry \par

## 2021-05-03 ENCOUNTER — APPOINTMENT (OUTPATIENT)
Dept: PLASTIC SURGERY | Facility: CLINIC | Age: 51
End: 2021-05-03
Payer: COMMERCIAL

## 2021-05-03 PROCEDURE — 99212 OFFICE O/P EST SF 10 MIN: CPT

## 2021-05-03 PROCEDURE — 99072 ADDL SUPL MATRL&STAF TM PHE: CPT

## 2021-05-03 NOTE — PHYSICAL EXAM
[de-identified] : well-appearing, NAD [de-identified] : BL breasts soft, incisions well-healed (IMF scars less red and raised, nontender), BL nipple sensation intact, no s/s cellulitis or fluid collection, excellent symmetry \par

## 2021-05-03 NOTE — ASSESSMENT
[FreeTextEntry1] : 51 yo woman with PMHx of scoliosis with chest wall asymmetry with symptomatic bilateral macromastia with Grade II ptosis, now 3.5 months s/p BBR, doing well.\par Right breast needle aspiration performed 11/23- negative\par \par HTS vs early keloid scar BL IMF, symptomatic improvement\par No kenalog injection\par \par -c/w silicone sheeting BL breast scars\par -Scar massage\par -Reviewed treatment options: kenalog injection vs silicone sheeting.  B/R/A discussed.  Pt wishes to undergo silicone sheeting.\par -all questions were answered\par -Follow up in 6-8 weeks\par \par Due to COVID-19, pre-visit patient instructions were explained to the patient and their symptoms were checked upon arrival. Masks were used by the healthcare provider and staff and the examination room was cleaned after the patient visit concluded\par \par

## 2021-05-03 NOTE — DATA REVIEWED
[FreeTextEntry1] :  Pathology             Final\par \par No Documents Attached\par \par \par \par   Brien Accession Number : [id number]\par \par CARINA SHINE 2\par \par \par \par Surgical Final Report\par \par \par \par \par Final Diagnosis\par 1. Right breast tissue (553 gms):\par - Fragments of skin and breast tissue consistent with surgical\par reduction procedure specimen (performed for clinical\par macromastia).\par - Fibrocystic changes\par - No malignancy is seen in the examined tissue sections.\par \par 2. Left breast tissue (798 gms):\par - Fragments of skin and breast tissue consistent with surgical\par reduction procedure specimen (performed for clinical\par macromastia).\par - Fibrocystic changes.\par - No malignancy is seen in the examined tissue sections.\par \par Verified by: Chaim Porter MD\par (Electronic Signature)\par Reported on: 11/20/20 13:00 EST, [address] [place] [address]\[facility] [place] [address]\par Phone: [phone]   Fax: [phone]\par _________________________________________________________________\par \par Clinical History\par Bilateral breast reduction\par \par Specimen(s) Submitted\par 1     Right breast tissue\par 2     Left breast tissue\par \par Gross Description\par 1. The specimen is received fresh, labeled "right breast tissue"\par and consists of fragments of lobulated fibroadipose tissue with\par skin measuring 20 x 15 x 6 cm in aggregate and weighing 553 gm.\par The specimen is serially sectioned to show lobulated yellow\par adipose tissue with multiple whitish firm areas. Representative\par sections are submitted. (5 blocks)\par \par 2. The specimen is received fresh, labeled "left breast tissue"\par and consists of multiple fragments of lobulated fibroadipose\par tissue with skin measuring 20 x 20 x 6 cm in aggregate and\par weighing 798 gm. Specimen is serially sectioned to show yellow\par fibroadipose tissue with whitish areas. Representative sections\par are submitted. (5 blocks)\par \par Specimen was received and underwent gross examination at [facility], [address] [place] [address],\[place] [address].\par \par \par \par \par \par \par TIANNASHERI AQUINOAKBAR             2\par \par \par \par Surgical Final Report\par \par \par \par \par 11/17/20 16:55 ia\par \par Perioperative Diagnosis\par Bilateral macromastia\par \par  \par \par  Ordered by: SEDRICK SOLIMAN       Collected/Examined: 17Nov2020 02:30PM       \par Verified by: DAWN STONE 23Nov2020 05:41PM       \par  Result Communication: Discussed results with patient;\par Stage: Final       \par  Performed at: [facility]       Resulted: 20Nov2020 01:00PM       Last Updated: 23Nov2020 05:41PM       Accession: [id number]

## 2021-05-03 NOTE — HISTORY OF PRESENT ILLNESS
[FreeTextEntry1] : 51 yo  F perimenopausal with PMHx dense large breasts since puberty.  She had waited for breast reduction until after having children as she wanted to breast feed.  she reports having upper and mid back pain, shoulder strap grooving, and chronic skin changes IMF. Recommended by chiropractor to seek evaluation for breast reduction as ongoing back pain exacerbated by large breasts.\par \par Weight stable for at least 6 months\par \par FamHx: no breast cancer, skin cancer\par +Breastfeeding\par Current bra size: 36 DDD, would like to be as small as possible (B to C cup)\par \par Interval hx (10/23/20). Patient presents today with her  for f/u to address additional concerns. Denies any new complaints or changes to her status. Interested in pursuing reduction mammoplasty. \par \par Interval hx (20): Pt presents today POD #6 s/p BBR. C/o mild postop pain which has resolved, taking only Tylenol PRN. Denies f/c, CP/SOB, calf pain, or cough. Happy with results thus far reporting improvement of neck and back pain.\par \par Interval hx (20): Pt presents today POD #15 s/p BBR. Doing very well and reporting significant improvement in neck and back pain. Happy with results. Denies any f/c or bleeding c/o periincisional pruritus. \par \par Interval hx (21): 7.5 weeks s/p BBR. Reports resolution of brassiere grooving and neck pain, improvement of back pain. Happy with results.\par \par Interval hx (3/5/21): 3.5 months s/p BBR. She is now ~36D cup.  Moderate residual back pain , possible 2/2 scoliosis. c/o raised scars with itchiness.\par \par Interval hx (5/3/21):  5.5 month s/p BBR.  Here for scar evaluation.  Silicone sheeting daily use since last visit with relief of pain, still with some pruritus.

## 2021-07-19 ENCOUNTER — APPOINTMENT (OUTPATIENT)
Dept: PLASTIC SURGERY | Facility: CLINIC | Age: 51
End: 2021-07-19
Payer: COMMERCIAL

## 2021-07-19 PROCEDURE — 99072 ADDL SUPL MATRL&STAF TM PHE: CPT

## 2021-07-19 PROCEDURE — 99212 OFFICE O/P EST SF 10 MIN: CPT

## 2021-07-19 NOTE — ASSESSMENT
[FreeTextEntry1] : 51 yo woman with PMHx of scoliosis with chest wall asymmetry with symptomatic bilateral macromastia with Grade II ptosis, now 8 months s/p BBR, doing well.\par Right breast needle aspiration performed 11/23- negative\par \par HTS vs early keloid scar BL IMF, symptomatic improvement\par No kenalog injection\par \par -c/w silicone sheeting BL breast scars\par -Scar massage\par -Reviewed treatment options: kenalog injection vs silicone sheeting.  B/R/A discussed.  Pt wishes to undergo silicone sheeting alone.  Declined kenalog injection\par -All reviewed keloid scar exicison, risk of recurrence.  pt declined excision.\par -all questions were answered\par -Follow up in 3 months\par \par Due to COVID-19, pre-visit patient instructions were explained to the patient and their symptoms were checked upon arrival. Masks were used by the healthcare provider and staff and the examination room was cleaned after the patient visit concluded\par \par

## 2021-07-19 NOTE — PHYSICAL EXAM
[de-identified] : well-appearing, NAD [de-identified] : BL breasts soft, incisions well-healed (IMF scars less red and raised, nontender, pruritic lateral IMF BL), BL nipple sensation intact, no s/s cellulitis or fluid collection, excellent symmetry

## 2021-07-19 NOTE — DATA REVIEWED
[FreeTextEntry1] :  Pathology             Final\par \par No Documents Attached\par \par \par \par   Brien Accession Number : [id number]\par \par CARINA SHINE 2\par \par \par \par Surgical Final Report\par \par \par \par \par Final Diagnosis\par 1. Right breast tissue (553 gms):\par - Fragments of skin and breast tissue consistent with surgical\par reduction procedure specimen (performed for clinical\par macromastia).\par - Fibrocystic changes\par - No malignancy is seen in the examined tissue sections.\par \par 2. Left breast tissue (798 gms):\par - Fragments of skin and breast tissue consistent with surgical\par reduction procedure specimen (performed for clinical\par macromastia).\par - Fibrocystic changes.\par - No malignancy is seen in the examined tissue sections.\par \par Verified by: Chaim Porter MD\par (Electronic Signature)\par Reported on: 11/20/20 13:00 EST, [address] [place] [address]\[facility] [place] [address]\par Phone: [phone]   Fax: [phone]\par _________________________________________________________________\par \par Clinical History\par Bilateral breast reduction\par \par Specimen(s) Submitted\par 1     Right breast tissue\par 2     Left breast tissue\par \par Gross Description\par 1. The specimen is received fresh, labeled "right breast tissue"\par and consists of fragments of lobulated fibroadipose tissue with\par skin measuring 20 x 15 x 6 cm in aggregate and weighing 553 gm.\par The specimen is serially sectioned to show lobulated yellow\par adipose tissue with multiple whitish firm areas. Representative\par sections are submitted. (5 blocks)\par \par 2. The specimen is received fresh, labeled "left breast tissue"\par and consists of multiple fragments of lobulated fibroadipose\par tissue with skin measuring 20 x 20 x 6 cm in aggregate and\par weighing 798 gm. Specimen is serially sectioned to show yellow\par fibroadipose tissue with whitish areas. Representative sections\par are submitted. (5 blocks)\par \par Specimen was received and underwent gross examination at [facility], [address] [place] [address],\[place] [address].\par \par \par \par \par \par \par TIANNASHERI AQUINOAKBAR             2\par \par \par \par Surgical Final Report\par \par \par \par \par 11/17/20 16:55 ia\par \par Perioperative Diagnosis\par Bilateral macromastia\par \par  \par \par  Ordered by: SEDRICK SOLIMAN       Collected/Examined: 17Nov2020 02:30PM       \par Verified by: DAWN STONE 23Nov2020 05:41PM       \par  Result Communication: Discussed results with patient;\par Stage: Final       \par  Performed at: [facility]       Resulted: 20Nov2020 01:00PM       Last Updated: 23Nov2020 05:41PM       Accession: [id number]

## 2021-07-19 NOTE — HISTORY OF PRESENT ILLNESS
[FreeTextEntry1] : 49 yo  F perimenopausal with PMHx dense large breasts since puberty.  She had waited for breast reduction until after having children as she wanted to breast feed.  she reports having upper and mid back pain, shoulder strap grooving, and chronic skin changes IMF. Recommended by chiropractor to seek evaluation for breast reduction as ongoing back pain exacerbated by large breasts.\par \par Weight stable for at least 6 months\par \par FamHx: no breast cancer, skin cancer\par +Breastfeeding\par Current bra size: 36 DDD, would like to be as small as possible (B to C cup)\par \par Interval hx (10/23/20). Patient presents today with her  for f/u to address additional concerns. Denies any new complaints or changes to her status. Interested in pursuing reduction mammoplasty. \par \par Interval hx (20): Pt presents today POD #6 s/p BBR. C/o mild postop pain which has resolved, taking only Tylenol PRN. Denies f/c, CP/SOB, calf pain, or cough. Happy with results thus far reporting improvement of neck and back pain.\par \par Interval hx (20): Pt presents today POD #15 s/p BBR. Doing very well and reporting significant improvement in neck and back pain. Happy with results. Denies any f/c or bleeding c/o periincisional pruritus. \par \par Interval hx (21): 7.5 weeks s/p BBR. Reports resolution of brassiere grooving and neck pain, improvement of back pain. Happy with results.\par \par Interval hx (3/5/21): 3.5 months s/p BBR. She is now ~36D cup.  Moderate residual back pain , possible 2/2 scoliosis. c/o raised scars with itchiness.\par \par Interval hx (5/3/21):  5.5 month s/p BBR.  Here for scar evaluation.  Silicone sheeting daily use since last visit with relief of pain, still with some pruritus.\par \par Interval hx (21): 8 months s/p BBR.  here for scar evaluation.  Compliant with silicone sheeting which has help with scar  'fading"  Otherwise scar still very pruritus.

## 2021-10-11 ENCOUNTER — APPOINTMENT (OUTPATIENT)
Dept: PLASTIC SURGERY | Facility: CLINIC | Age: 51
End: 2021-10-11
Payer: COMMERCIAL

## 2021-10-11 PROCEDURE — 99212 OFFICE O/P EST SF 10 MIN: CPT

## 2021-10-11 NOTE — PHYSICAL EXAM
[de-identified] : well-appearing, NAD [de-identified] : BL breasts soft, incisions well-healed (IMF scars pink, nontender, pruritic lateral IMF BL), BL nipple sensation intact, no s/s cellulitis or fluid collection, excellent symmetry

## 2021-10-11 NOTE — ASSESSMENT
[FreeTextEntry1] : 49 yo woman with PMHx of scoliosis with chest wall asymmetry with symptomatic bilateral macromastia with Grade II ptosis, now 11 months s/p BBR, doing well.\par Right breast needle aspiration performed 11/23- negative\par \par HTS vs early keloid scar BL IMF, symptomatic improvement\par No kenalog injection\par \par -c/w silicone sheeting BL breast scars\par -Scar massage\par -Reviewed treatment options: kenalog injection vs silicone sheeting.  B/R/A discussed.  Pt wishes to undergo silicone sheeting alone.  Declined kenalog injection today\par -Reasonable decision given slow progressive improvement of sca symptoms\par -all questions were answered\par -Follow up in 3 months\par \par Due to COVID-19, pre-visit patient instructions were explained to the patient and their symptoms were checked upon arrival. Masks were used by the healthcare provider and staff and the examination room was cleaned after the patient visit concluded\par \par

## 2021-10-11 NOTE — HISTORY OF PRESENT ILLNESS
[FreeTextEntry1] : 51 yo  F perimenopausal with PMHx dense large breasts since puberty.  She had waited for breast reduction until after having children as she wanted to breast feed.  she reports having upper and mid back pain, shoulder strap grooving, and chronic skin changes IMF. Recommended by chiropractor to seek evaluation for breast reduction as ongoing back pain exacerbated by large breasts.\par \par Weight stable for at least 6 months\par \par FamHx: no breast cancer, skin cancer\par +Breastfeeding\par Current bra size: 36 DDD, would like to be as small as possible (B to C cup)\par \par Interval hx (10/23/20). Patient presents today with her  for f/u to address additional concerns. Denies any new complaints or changes to her status. Interested in pursuing reduction mammoplasty. \par \par Interval hx (20): Pt presents today POD #6 s/p BBR. C/o mild postop pain which has resolved, taking only Tylenol PRN. Denies f/c, CP/SOB, calf pain, or cough. Happy with results thus far reporting improvement of neck and back pain.\par \par Interval hx (20): Pt presents today POD #15 s/p BBR. Doing very well and reporting significant improvement in neck and back pain. Happy with results. Denies any f/c or bleeding c/o periincisional pruritus. \par \par Interval hx (21): 7.5 weeks s/p BBR. Reports resolution of brassiere grooving and neck pain, improvement of back pain. Happy with results.\par \par Interval hx (3/5/21): 3.5 months s/p BBR. She is now ~36D cup.  Moderate residual back pain , possible 2/2 scoliosis. c/o raised scars with itchiness.\par \par Interval hx (5/3/21):  5.5 month s/p BBR.  Here for scar evaluation.  Silicone sheeting daily use since last visit with relief of pain, still with some pruritus.\par \par Interval hx (21): 8 months s/p BBR.  here for scar evaluation.  Compliant with silicone sheeting which has help with scar  'fading"  Otherwise scar still very pruritic.\par \par Interval hx (10/11/21): 11 months s/p BBR. ere for scar evaluation.  Compliant with silicone sheeting which has help with scar  'fading"  Otherwise scar less pruritic and painful

## 2021-10-11 NOTE — DATA REVIEWED
[FreeTextEntry1] :  Pathology             Final\par \par No Documents Attached\par \par \par \par   Brien Accession Number : [id number]\par \par CARINA SHINE 2\par \par \par \par Surgical Final Report\par \par \par \par \par Final Diagnosis\par 1. Right breast tissue (553 gms):\par - Fragments of skin and breast tissue consistent with surgical\par reduction procedure specimen (performed for clinical\par macromastia).\par - Fibrocystic changes\par - No malignancy is seen in the examined tissue sections.\par \par 2. Left breast tissue (798 gms):\par - Fragments of skin and breast tissue consistent with surgical\par reduction procedure specimen (performed for clinical\par macromastia).\par - Fibrocystic changes.\par - No malignancy is seen in the examined tissue sections.\par \par Verified by: Chaim Porter MD\par (Electronic Signature)\par Reported on: 11/20/20 13:00 EST, [address] [place] [address]\[facility] [place] [address]\par Phone: [phone]   Fax: [phone]\par _________________________________________________________________\par \par Clinical History\par Bilateral breast reduction\par \par Specimen(s) Submitted\par 1     Right breast tissue\par 2     Left breast tissue\par \par Gross Description\par 1. The specimen is received fresh, labeled "right breast tissue"\par and consists of fragments of lobulated fibroadipose tissue with\par skin measuring 20 x 15 x 6 cm in aggregate and weighing 553 gm.\par The specimen is serially sectioned to show lobulated yellow\par adipose tissue with multiple whitish firm areas. Representative\par sections are submitted. (5 blocks)\par \par 2. The specimen is received fresh, labeled "left breast tissue"\par and consists of multiple fragments of lobulated fibroadipose\par tissue with skin measuring 20 x 20 x 6 cm in aggregate and\par weighing 798 gm. Specimen is serially sectioned to show yellow\par fibroadipose tissue with whitish areas. Representative sections\par are submitted. (5 blocks)\par \par Specimen was received and underwent gross examination at [facility], [address] [place] [address],\[place] [address].\par \par \par \par \par \par \par TIANNASHERI AQUINOAKBAR             2\par \par \par \par Surgical Final Report\par \par \par \par \par 11/17/20 16:55 ia\par \par Perioperative Diagnosis\par Bilateral macromastia\par \par  \par \par  Ordered by: SEDRICK SOLIMAN       Collected/Examined: 17Nov2020 02:30PM       \par Verified by: DAWN STONE 23Nov2020 05:41PM       \par  Result Communication: Discussed results with patient;\par Stage: Final       \par  Performed at: [facility]       Resulted: 20Nov2020 01:00PM       Last Updated: 23Nov2020 05:41PM       Accession: [id number]

## 2021-11-04 ENCOUNTER — APPOINTMENT (OUTPATIENT)
Dept: OBGYN | Facility: CLINIC | Age: 51
End: 2021-11-04
Payer: COMMERCIAL

## 2021-11-04 VITALS
BODY MASS INDEX: 25.11 KG/M2 | SYSTOLIC BLOOD PRESSURE: 128 MMHG | WEIGHT: 160 LBS | HEIGHT: 67 IN | DIASTOLIC BLOOD PRESSURE: 78 MMHG

## 2021-11-04 DIAGNOSIS — Z97.5 PRESENCE OF (INTRAUTERINE) CONTRACEPTIVE DEVICE: ICD-10-CM

## 2021-11-04 DIAGNOSIS — Z01.419 ENCOUNTER FOR GYNECOLOGICAL EXAMINATION (GENERAL) (ROUTINE) W/OUT ABNORMAL FINDINGS: ICD-10-CM

## 2021-11-04 PROCEDURE — 99396 PREV VISIT EST AGE 40-64: CPT

## 2021-11-04 NOTE — DISCUSSION/SUMMARY
[FreeTextEntry1] : Pap done\par Self breast exam stressed\par Follow-up with breast surgeon as scheduled\par Prescribed yearly bilateral screening mammogram and bilateral breast ultrasound\par Keep menstrual calendar\par Issues regarding menopause/perimenopause discussed with patient and her \par Issues regarding menopausal symptoms discussed with patient including various etiologies, diagnostic and treatment options.\par Patient is considering removal of intrauterine device\par Follow-up yearly or as needed

## 2021-11-04 NOTE — HISTORY OF PRESENT ILLNESS
[FreeTextEntry1] : Patient is 51 years old para 2-0-1-2 last menstrual period August 2021\par Patient notes occasional menopausal symptoms including hot flashes.\par She has a ParaGard intrauterine device since June 2016.  Patient states that she was recently .  Patient states that she had a bilateral breast reduction in November 2020.

## 2022-01-24 ENCOUNTER — APPOINTMENT (OUTPATIENT)
Dept: PLASTIC SURGERY | Facility: CLINIC | Age: 52
End: 2022-01-24

## 2022-02-25 ENCOUNTER — APPOINTMENT (OUTPATIENT)
Dept: PLASTIC SURGERY | Facility: CLINIC | Age: 52
End: 2022-02-25

## 2022-12-02 NOTE — PRE-ANESTHESIA EVALUATION ADULT - BMI (KG/M2)
Last OV 11/01/22  Next OV 12/06/22
Patient is overdue for a follow-up by months. It was recommended she schedule an appointment multiple times by coverage.   She has an appointment next week and we will review medications at that time
26.7

## 2023-06-19 ENCOUNTER — APPOINTMENT (OUTPATIENT)
Dept: OBGYN | Facility: CLINIC | Age: 53
End: 2023-06-19

## 2023-07-10 ENCOUNTER — APPOINTMENT (OUTPATIENT)
Dept: OBGYN | Facility: CLINIC | Age: 53
End: 2023-07-10
Payer: COMMERCIAL

## 2023-07-10 VITALS
BODY MASS INDEX: 27.15 KG/M2 | WEIGHT: 173 LBS | DIASTOLIC BLOOD PRESSURE: 82 MMHG | SYSTOLIC BLOOD PRESSURE: 122 MMHG | HEIGHT: 67 IN

## 2023-07-10 DIAGNOSIS — Z01.419 ENCOUNTER FOR GYNECOLOGICAL EXAMINATION (GENERAL) (ROUTINE) W/OUT ABNORMAL FINDINGS: ICD-10-CM

## 2023-07-10 PROCEDURE — 99396 PREV VISIT EST AGE 40-64: CPT

## 2023-07-10 NOTE — HISTORY OF PRESENT ILLNESS
[FreeTextEntry1] : Patient is 53 years old para 2-0-1-2 last menstrual period March 2022.\par She has a ParaGard intrauterine device since June 2016.

## 2023-07-10 NOTE — DISCUSSION/SUMMARY
[FreeTextEntry1] : Pap done\par Self breast exam stressed\par Prescribed hormone profile\par Patient states that she will return for removal of intrauterine device accompanied by her \par Follow-up as needed

## 2023-07-10 NOTE — PHYSICAL EXAM
[Chaperone Present] : A chaperone was present in the examining room during all aspects of the physical examination [FreeTextEntry1] : gely [Appropriately responsive] : appropriately responsive [Alert] : alert [No Acute Distress] : no acute distress [No Lymphadenopathy] : no lymphadenopathy [Regular Rate Rhythm] : regular rate rhythm [No Murmurs] : no murmurs [Clear to Auscultation B/L] : clear to auscultation bilaterally [Soft] : soft [Non-tender] : non-tender [Non-distended] : non-distended [No HSM] : No HSM [No Lesions] : no lesions [No Mass] : no mass [Oriented x3] : oriented x3 [Examination Of The Breasts] : a normal appearance [No Masses] : no breast masses were palpable [Labia Majora] : normal [Labia Minora] : normal [Normal] : normal [Uterine Adnexae] : normal

## 2023-10-18 ENCOUNTER — APPOINTMENT (OUTPATIENT)
Dept: ORTHOPEDIC SURGERY | Facility: CLINIC | Age: 53
End: 2023-10-18
Payer: COMMERCIAL

## 2023-10-18 VITALS — BODY MASS INDEX: 23.54 KG/M2 | WEIGHT: 150 LBS | HEIGHT: 67 IN

## 2023-10-18 PROCEDURE — 73140 X-RAY EXAM OF FINGER(S): CPT | Mod: LT

## 2023-10-18 PROCEDURE — 99203 OFFICE O/P NEW LOW 30 MIN: CPT

## 2023-10-18 RX ORDER — NABUMETONE 500 MG/1
500 TABLET, FILM COATED ORAL
Qty: 60 | Refills: 0 | Status: ACTIVE | COMMUNITY
Start: 2023-10-18 | End: 1900-01-01

## 2023-10-28 ENCOUNTER — APPOINTMENT (OUTPATIENT)
Dept: MRI IMAGING | Facility: CLINIC | Age: 53
End: 2023-10-28
Payer: COMMERCIAL

## 2023-10-28 PROCEDURE — 73218 MRI UPPER EXTREMITY W/O DYE: CPT | Mod: LT

## 2023-10-30 ENCOUNTER — APPOINTMENT (OUTPATIENT)
Dept: ORTHOPEDIC SURGERY | Facility: CLINIC | Age: 53
End: 2023-10-30

## 2023-11-24 ENCOUNTER — APPOINTMENT (OUTPATIENT)
Dept: ORTHOPEDIC SURGERY | Facility: CLINIC | Age: 53
End: 2023-11-24

## 2023-11-24 ENCOUNTER — APPOINTMENT (OUTPATIENT)
Dept: ORTHOPEDIC SURGERY | Facility: CLINIC | Age: 53
End: 2023-11-24
Payer: COMMERCIAL

## 2023-11-24 DIAGNOSIS — M79.89 OTHER SPECIFIED SOFT TISSUE DISORDERS: ICD-10-CM

## 2023-11-24 PROCEDURE — 99202 OFFICE O/P NEW SF 15 MIN: CPT

## 2023-12-27 ENCOUNTER — APPOINTMENT (OUTPATIENT)
Dept: ORTHOPEDIC SURGERY | Facility: CLINIC | Age: 53
End: 2023-12-27
Payer: COMMERCIAL

## 2023-12-27 PROCEDURE — 73562 X-RAY EXAM OF KNEE 3: CPT | Mod: LT

## 2023-12-27 PROCEDURE — 99213 OFFICE O/P EST LOW 20 MIN: CPT

## 2023-12-27 RX ORDER — NABUMETONE 500 MG/1
500 TABLET, FILM COATED ORAL
Qty: 60 | Refills: 0 | Status: ACTIVE | COMMUNITY
Start: 2023-12-27 | End: 1900-01-01

## 2023-12-27 NOTE — IMAGING
[de-identified] : On examination of her left knee she has no erythema, no swelling, no ecchymosis.  No tenderness to palpation over the patella, negative patellar grind.  She is able to straight leg raise.  She has full range of motion of the knee.  Mild tenderness to the posterior aspect of the knee.  No point tenderness over the medial or lateral joint line.  No tenderness over the patellar or quadricep tendons.  Negative varus valgus stress test, negative anterior drawer.  Mild pain with Trudi's, calf is soft is nontender.  X-rays taken in the office today of the left knee show some mild degenerative changes, no fractures, dislocations, or other bony abnormalities noted.

## 2023-12-27 NOTE — HISTORY OF PRESENT ILLNESS
[de-identified] : 53-year-old female is here today for evaluation of her left knee.  Patient states she has been having mild on and off pain in the knee that would last for a day or so and go away after she took Advil.  She states for the last 5 to 6 days she has been having pain in the left knee that has not been improving.  The pain is worse with walking and climbing stairs and if she squats down.  She had no recent injury or trauma.  She denies any numbness tingling in the leg or any calf pain.

## 2023-12-27 NOTE — DISCUSSION/SUMMARY
[de-identified] : At this time she is going to wear any compression sleeve, alternate between ice and warm compresses, anti-inflammatories.  We are going to send her for an MRI to rule out a meniscal tear.  Will schedule her follow-up in a few weeks for repeat evaluation. Patient will call me if any other problems or concerns.  Patient verbalized understanding and agreed with the plan, all questions were answered in the office today.

## 2024-01-05 ENCOUNTER — APPOINTMENT (OUTPATIENT)
Dept: MRI IMAGING | Facility: CLINIC | Age: 54
End: 2024-01-05
Payer: COMMERCIAL

## 2024-01-05 PROCEDURE — 73721 MRI JNT OF LWR EXTRE W/O DYE: CPT | Mod: LT

## 2024-01-23 ENCOUNTER — APPOINTMENT (OUTPATIENT)
Dept: ORTHOPEDIC SURGERY | Facility: CLINIC | Age: 54
End: 2024-01-23
Payer: COMMERCIAL

## 2024-01-23 VITALS — BODY MASS INDEX: 23.54 KG/M2 | HEIGHT: 67 IN | WEIGHT: 150 LBS

## 2024-01-23 DIAGNOSIS — M25.562 PAIN IN LEFT KNEE: ICD-10-CM

## 2024-01-23 PROCEDURE — 99203 OFFICE O/P NEW LOW 30 MIN: CPT

## 2024-01-23 NOTE — HISTORY OF PRESENT ILLNESS
[de-identified] : Patient here for evaluation left knee pain. has been feeling better  NAD Left knee No skin breakdown Medial joint line ttp Positive cole Negative lachman Negative varus/valgus instability ROM 0-130 Pain with forced extension and flexion NVI Compartments soft and NT  Xray reviewed and significant for left knee mild degenerative changes  mri left knee mmt, chondromalacia  plan went over findings explained the imaging op vs nonop since pain is well controlled will cont cons tx hep and pain control if pain gets worse fu in office spoke about pathology and prognosis

## 2024-01-29 ENCOUNTER — APPOINTMENT (OUTPATIENT)
Dept: ORTHOPEDIC SURGERY | Facility: CLINIC | Age: 54
End: 2024-01-29

## 2024-11-07 ENCOUNTER — APPOINTMENT (OUTPATIENT)
Dept: OBGYN | Facility: CLINIC | Age: 54
End: 2024-11-07

## 2025-01-14 NOTE — ASU PATIENT PROFILE, ADULT - NS PRO TALK SOMEONE YN
Call Central Scheduling [phone] to make an appointment for MRI Abdomen w wo contrast to include Elastography. Go to lab 2 days before MRI appointment to have blood work done.    no